# Patient Record
Sex: FEMALE | Race: WHITE | URBAN - METROPOLITAN AREA
[De-identification: names, ages, dates, MRNs, and addresses within clinical notes are randomized per-mention and may not be internally consistent; named-entity substitution may affect disease eponyms.]

---

## 2017-01-18 DIAGNOSIS — E78.2 MIXED HYPERLIPIDEMIA: Primary | ICD-10-CM

## 2017-01-18 DIAGNOSIS — E88.810 METABOLIC SYNDROME X: ICD-10-CM

## 2017-01-18 DIAGNOSIS — M25.569 ARTHRALGIA OF KNEE, UNSPECIFIED LATERALITY: ICD-10-CM

## 2017-01-25 DIAGNOSIS — E78.2 MIXED HYPERLIPIDEMIA: ICD-10-CM

## 2017-01-25 DIAGNOSIS — E88.810 METABOLIC SYNDROME X: ICD-10-CM

## 2017-01-25 DIAGNOSIS — M25.569 ARTHRALGIA OF KNEE, UNSPECIFIED LATERALITY: ICD-10-CM

## 2017-01-25 LAB
ALT SERPL W P-5'-P-CCNC: 64 U/L (ref 0–50)
AST SERPL W P-5'-P-CCNC: 31 U/L (ref 0–45)
CHOLEST SERPL-MCNC: 203 MG/DL
CK SERPL-CCNC: 165 U/L (ref 30–225)
HDLC SERPL-MCNC: 39 MG/DL
LDLC SERPL CALC-MCNC: ABNORMAL MG/DL
NONHDLC SERPL-MCNC: 164 MG/DL
TRIGL SERPL-MCNC: 414 MG/DL

## 2017-01-25 PROCEDURE — 84450 TRANSFERASE (AST) (SGOT): CPT | Performed by: FAMILY MEDICINE

## 2017-01-25 PROCEDURE — 82550 ASSAY OF CK (CPK): CPT | Performed by: FAMILY MEDICINE

## 2017-01-25 PROCEDURE — 80061 LIPID PANEL: CPT | Performed by: FAMILY MEDICINE

## 2017-01-25 PROCEDURE — 84460 ALANINE AMINO (ALT) (SGPT): CPT | Performed by: FAMILY MEDICINE

## 2017-01-25 PROCEDURE — 36415 COLL VENOUS BLD VENIPUNCTURE: CPT | Performed by: FAMILY MEDICINE

## 2017-02-06 DIAGNOSIS — E03.8 CENTRAL HYPOTHYROIDISM: Primary | ICD-10-CM

## 2017-02-06 RX ORDER — LEVOTHYROXINE SODIUM 175 UG/1
175 TABLET ORAL DAILY
Qty: 90 TABLET | Refills: 0 | Status: SHIPPED | OUTPATIENT
Start: 2017-02-06 | End: 2017-05-05

## 2017-02-06 NOTE — TELEPHONE ENCOUNTER
levothyroxine (SYNTHROID, LEVOTHROID) 175 MCG tablet      Last Written Prescription Date:  5/12/2016  Last Fill Quantity: 90,   # refills: 3  Last Office Visit : 12/9/2016  Future Office visit:  2/27/2017    Routing refill request to provider for review/approval because:  Per protocol-  Provider preference.

## 2017-02-21 DIAGNOSIS — E66.89 HYPOTHALAMIC OBESITY: Primary | ICD-10-CM

## 2017-02-21 DIAGNOSIS — E23.0 HYPOPITUITARISM DUE TO BRAIN TUMOR (H): ICD-10-CM

## 2017-02-21 DIAGNOSIS — E23.2 DIABETES INSIPIDUS (H): ICD-10-CM

## 2017-02-21 DIAGNOSIS — E11.9 TYPE 2 DIABETES MELLITUS WITHOUT COMPLICATION, WITHOUT LONG-TERM CURRENT USE OF INSULIN (H): ICD-10-CM

## 2017-02-21 DIAGNOSIS — D44.4: ICD-10-CM

## 2017-02-21 DIAGNOSIS — D49.6 HYPOPITUITARISM DUE TO BRAIN TUMOR (H): ICD-10-CM

## 2017-02-21 DIAGNOSIS — E22.1 HYPERPROLACTINEMIA (H): ICD-10-CM

## 2017-02-24 DIAGNOSIS — E11.9 TYPE 2 DIABETES MELLITUS WITHOUT COMPLICATION, WITHOUT LONG-TERM CURRENT USE OF INSULIN (H): ICD-10-CM

## 2017-02-24 DIAGNOSIS — E22.1 HYPERPROLACTINEMIA (H): ICD-10-CM

## 2017-02-24 DIAGNOSIS — D44.4: ICD-10-CM

## 2017-02-24 DIAGNOSIS — E23.2 DIABETES INSIPIDUS (H): ICD-10-CM

## 2017-02-24 DIAGNOSIS — E23.0 HYPOPITUITARISM DUE TO BRAIN TUMOR (H): ICD-10-CM

## 2017-02-24 DIAGNOSIS — E66.89 HYPOTHALAMIC OBESITY: ICD-10-CM

## 2017-02-24 DIAGNOSIS — D49.6 HYPOPITUITARISM DUE TO BRAIN TUMOR (H): ICD-10-CM

## 2017-02-24 LAB
CHOLEST SERPL-MCNC: 368 MG/DL
HBA1C MFR BLD: 6 % (ref 4.3–6)
HDLC SERPL-MCNC: 29 MG/DL
LDLC SERPL CALC-MCNC: ABNORMAL MG/DL
NONHDLC SERPL-MCNC: 339 MG/DL
PROLACTIN SERPL-MCNC: 0 UG/L (ref 3–27)
SODIUM SERPL-SCNC: 135 MMOL/L (ref 133–144)
T4 FREE SERPL-MCNC: 0.83 NG/DL (ref 0.76–1.46)
TRIGL SERPL-MCNC: 2225 MG/DL

## 2017-02-24 PROCEDURE — 80061 LIPID PANEL: CPT

## 2017-02-24 PROCEDURE — 84439 ASSAY OF FREE THYROXINE: CPT

## 2017-02-24 PROCEDURE — 84146 ASSAY OF PROLACTIN: CPT

## 2017-02-24 PROCEDURE — 36415 COLL VENOUS BLD VENIPUNCTURE: CPT

## 2017-02-24 PROCEDURE — 84295 ASSAY OF SERUM SODIUM: CPT

## 2017-02-24 PROCEDURE — 83036 HEMOGLOBIN GLYCOSYLATED A1C: CPT

## 2017-02-27 ENCOUNTER — OFFICE VISIT (OUTPATIENT)
Dept: ENDOCRINOLOGY | Facility: CLINIC | Age: 37
End: 2017-02-27

## 2017-02-27 VITALS
HEIGHT: 65 IN | WEIGHT: 225.9 LBS | HEART RATE: 76 BPM | SYSTOLIC BLOOD PRESSURE: 120 MMHG | BODY MASS INDEX: 37.64 KG/M2 | DIASTOLIC BLOOD PRESSURE: 76 MMHG

## 2017-02-27 DIAGNOSIS — N91.2 AMENORRHEA: ICD-10-CM

## 2017-02-27 DIAGNOSIS — E66.89 HYPOTHALAMIC OBESITY: ICD-10-CM

## 2017-02-27 DIAGNOSIS — E23.0 HYPOPITUITARISM DUE TO BRAIN TUMOR (H): ICD-10-CM

## 2017-02-27 DIAGNOSIS — E23.0 HYPOPITUITARISM DUE TO BRAIN TUMOR (H): Primary | ICD-10-CM

## 2017-02-27 DIAGNOSIS — E78.2 MIXED HYPERLIPIDEMIA: ICD-10-CM

## 2017-02-27 DIAGNOSIS — D49.6 HYPOPITUITARISM DUE TO BRAIN TUMOR (H): ICD-10-CM

## 2017-02-27 DIAGNOSIS — E11.9 TYPE 2 DIABETES MELLITUS WITHOUT COMPLICATION, WITHOUT LONG-TERM CURRENT USE OF INSULIN (H): ICD-10-CM

## 2017-02-27 DIAGNOSIS — D49.6 HYPOPITUITARISM DUE TO BRAIN TUMOR (H): Primary | ICD-10-CM

## 2017-02-27 RX ORDER — OMEGA-3-ACID ETHYL ESTERS 1 G/1
1 CAPSULE, LIQUID FILLED ORAL 2 TIMES DAILY
Qty: 120 CAPSULE | Refills: 1 | Status: SHIPPED | OUTPATIENT
Start: 2017-02-27 | End: 2017-03-24

## 2017-02-27 ASSESSMENT — PAIN SCALES - GENERAL: PAINLEVEL: NO PAIN (0)

## 2017-02-27 NOTE — PROGRESS NOTES
Endocrinology Consult Note    Attending ASSESSMENT/PLAN:     1.  Diabetes mellitus type 2. HgbA1c 6% on 2/24/17.     Would consider adding metformin for possible benefits on lipids and metabolism.  She is not interested in this at this time.    2.  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  She is not having nocturnal symptoms therefore I would not increase the DDAVP.   Not addressed    Hypopituitarism , partial - treatment is in place for the following:  A) hypothyroidism.  Treat to high normal free T4.  I didn't raise the LT4 dose today but will send her  A note and offer this.      B) hypogonadotropic hypogonadism causing secondary amenorrhea. Newly on cabergoline (since late 12/16) -nol periods yet. We will give it a few more months and then stop cabergoline if periods do not resume.  Measure estradiol.     Hypothalamic Obesity . She has been seen in Weight management in the past but hasnt' followed up there, isn't doing what they recommended.      Mixed Hyperlipidemia - very high TG and cholesterol.  This and the DM and central obesity combine to equal metabolic syndrome.  Intolerance of lipitor (arthralgia)- doesn't want to take a statin primarily, also doesn't want to take a pharmaceutial.    Consider pravastatin, fenofibrate    She agreed to try Lovaza.  STart with 1 gram bid and rapidly escalate to 2 grams BID.  iF we can't get it we will try fenofbrate.      Metabolic syndrome - Fatty liver in past imaging    Hyperprolactinemia -   Newly on cabergoline to see if we can restore her periods. Prolactin is now 0.      Greater than 50% of 25 minute appt on counseling    Gabriela Deal      Cc/ HISTORY OF PRESENT ILLNESS  Mariel presents, along with her friend,  for follow up of hypothalamic tumor, DI, hypopiuitarism and  DM, metabolic syndrome with significant mixed hyperlipidemia.  She is getting care in 2 systems, including [facility].  I have again  reviewed Care Everywhere.  She was last seen by me 12/16.  Since then, we started cabergoline to see if lowering the prolactin would restore her periods.  We also started Lipitor 20 mg/day followed one week later by zetia 10 mg/day.   She complained of arthralgia 1/17/17.    We have the following lipids  12/8/16: prior to start of lipitor: cholesterol 381, , HDL 35, ALT 51, glucose 113  1/25/17: on lipitor with arthralgia: cholesterol 203, , HDL 39, ALT 64,   2/24/17: off lipitor: cholesterol 368, TG 2225, HDL 29, HgbA1c 6%--she says this was after a fast from around 9 PM.  On further questioning she recalled eating Peanut butter bars (likely ingredients including high fructose corn syrup, peanut butter) the nigh prior to the lipids.  She was on Fish oil (? Dose - 2 capsules/day)  and lemon grass    She didn't tolerate the lipitor and really doesn't want to take it again.  She is asking about red yeast rice.  She wants to do more research.  She really doesn't want to do a pharmaceutical .     Her current regimen is :  LT4 175 mcg /day --   DDAVP  pill  in the AM, 3 PM and at HS.    Fish oil -   Lemon grass 5 drops and a capsule bid--    Brain mass  presented with secondary amenorrhea and visual changes.  MRI 2/4/15 showed Cystic suprasellar lesion , 3.5 cm,  elevating and  and anteriorly displacing the optic chiasm;   2/4/15 surgery (ANW)  decompressed fluid and extracted tumor.    Pathology was read as papillary craniopharyngioma.  Postoperatively, she has had DI for which she continues on DDAVP tid.   11/6/15 1 mcg cortrosyn stim test  showed cortisol stimulation to 19.8.      Mariel experienced significant weight gain.     Around presentation / hypothalamic surgery 185# . Since the surgery she gained weight - up to 218 lbs  She has been to weight management clinic. She took phentermine in the past,  but not the topimimate which Dr Hopson had also recommended. She has not been back to weight  management clinic.        Labs:  1/30/15: TSh 0.8, T4 6.6, FSH 1.3, prolactin 58.5, cortisol 13.3  Operation  2/24/15: prolactin 61.5, free T4 0.5, cortisol < 1, IGF1 106, cholesterol 742, TG 2409, Na 148, [id number], creatinine 0.74, urine specific gravity < 1.005  3/20/15: glucose 151, Ca 10, total protein 9, albumin 4.2, alk phos 81, bili 0.2, , AST 68  4/20/15: free T4 0.93, free T3 2.47, cholesterol 357, , HDL 34,   5/1/15: cortisol 1.1  6/16/15: TSH < 0.01, free t4 1.06  7/29/15: TSH 0.01, free T4 0.85, glucose 155 (she says this was a fasting test), Calcium 10.6, Na 144, K 4.4, Cl 104, Co2 21,   11/4/15: HgbA1c 6.7%.   5/19/16: LDL direct 132  7/14/16: cholesterol 178, , HDL 34, T3 80, free T4 0.986  9/27/16: cholesterol 341, , HDL 30, LDL direct 139, T3 111, free T4 0.89    Weights  2/2/15 188#  2/23/15: 197#  4/20/15: 212#  12/18/15: 209   11/14/16: 217#    images pushed to [facility]ite  1/28/15 MRI cystic suprasellar mass   5/4/15 MRI- normal appearing pituitary (read as optic chiasm well decompressed. No evidence of residual craniopharyngioma)  5/9/16 MRI  reports stability     REVIEW OF SYSTEMS  questions about diet  -? Ketogenic diet.   GI: no abdominal pain  Going to gratitude class today  Needs to research my recommendations.   Tolerating cabergoline - started before [place].  Has not had a period    Past Medical History  Past Medical History   Diagnosis Date     Central hypothyroidism 2015     Cervical high risk HPV (human papillomavirus) test positive 11/8/11     HPV 16     Craniopharyngioma in adult (H) 2/15     Diabetes insipidus (H) 2015     Diabetes mellitus (H) 2015     Fatty liver      on CT     Gestational diabetes      gestational 2008/2010     Hyperlipidemia      Hypogonadotropic hypogonadism (H) 2015     Hypothalamic obesity 2015     Past Surgical History   Procedure Laterality Date     Frontotemporal craniotomy Right 2/4/15     Resection of suprasellar tumor  2/4/15  "      Medications    Current Outpatient Prescriptions   Medication Sig Dispense Refill     omega-3 acid ethyl esters (LOVAZA) 1 G capsule Take 1 capsule (1 g) by mouth 2 times daily 120 capsule 1     levothyroxine (SYNTHROID/LEVOTHROID) 175 MCG tablet Take 1 tablet (175 mcg) by mouth daily 90 tablet 0     cabergoline (DOSTINEX) 0.5 MG tablet Take 0.5 tablets (0.25 mg) by mouth twice a week 12 tablet 1     desmopressin (DDAVP) 0.1 MG tablet 1 tab 3 times daily 270 tablet 3     order for DME Equipment being ordered: Medium plantar fasciitis night splint 1 Device 0     Fish Oil-Cholecalciferol (OMEGA-3 FISH OIL/VITAMIN D3) 8043-7159 MG-UNIT CAPS Take 2 capsules by mouth daily       blood glucose monitoring (HECTOR CONTOUR NEXT) test strip Use to test blood sugar 2 times daily or as directed. 3 month supply 200 each 3     MICROLET LANCETS MISC Use to test blood glucose twice daily.  3 month supply 200 each 4     atorvastatin (LIPITOR) 20 MG tablet Take 1 tablet (20 mg) by mouth daily (Patient not taking: Reported on 2/27/2017) 90 tablet 3     ezetimibe (ZETIA) 10 MG tablet Take 1 tablet (10 mg) by mouth daily (Patient not taking: Reported on 2/27/2017) 90 tablet 3       Allergies  Allergies   Allergen Reactions     Nkda [No Known Drug Allergies]        Family History  No pituitary tumors  No thyroid  No DM  No kidney stones  family history includes Alzheimer Disease in her paternal grandfather; Breast Cancer in her paternal grandmother; C.A.D. in her maternal grandfather; GASTROINTESTINAL DISEASE in her father; Hypertension in her mother; Lipids in her paternal grandmother.    Social History  Social History   Substance Use Topics     Smoking status: Current Some Day Smoker     Packs/day: 0.50     Types: Cigarettes     Smokeless tobacco: Never Used      Comment: Occ     Alcohol use No     ; working with ;     Physical Exam  /76  Pulse 76  Ht 1.651 m (5' 5\")  Wt 102.5 kg (225 lb 14.4 oz)  BMI 37.59 " kg/m2  Body mass index is 37.59 kg/(m^2).  GENERAL : pleasant young woman  In no apparent distress.   SKIN: Normal color.  No hirsutism, alopecia    EYES: PER, No scleral icterus,  No proptosis, conjunctival redness, stare, retraction;   NEURO: awake, alert, responds appropriately to questions.   Moves all extremities;       DATA REVIEW    ENDO DIABETES Latest Ref Rng & Units 1/25/2017   HEMOGLOBIN A1C 4.3 - 6.0 %    HEMOGLOBIN A1C, POC 4.3 - 6 %    HGB 11.7 - 15.7 g/dL    GLUCOSE 70 - 99 mg/dL    CHOLESTEROL <200 mg/dL 203 (H)   LDL CHOLESTEROL, CALCULATED <100 mg/dL Cannot estimate LDL when triglyceride exceeds 400 mg/dL   HDL CHOLESTEROL >49 mg/dL 39 (L)   VLDL-CHOLESTEROL 0 - 30 mg/dL    NON HDL CHOLESTEROL <130 mg/dL 164 (H)   TRIGLYCERIDES <150 mg/dL 414 (H)   POTASSIUM 3.4 - 5.3 mmol/L    CK TOTAL 30 - 225 U/L 165   ALT 0 - 50 U/L 64 (H)   AST 0 - 45 U/L 31   WBC 4.0 - 11.0 10e9/L    RBC 3.8 - 5.2 10e12/L    HGB 11.7 - 15.7 g/dL    HCT 35.0 - 47.0 %    MCV 78 - 100 fl    MCH 26.5 - 33.0 pg    MCHC 31.5 - 36.5 g/dL    RDW 10.0 - 15.0 %     - 450 10e9/L      ENDO DIABETES Latest Ref Rng & Units 2/24/2017   HEMOGLOBIN A1C 4.3 - 6.0 % 6.0   HEMOGLOBIN A1C, POC 4.3 - 6 %    HGB 11.7 - 15.7 g/dL    GLUCOSE 70 - 99 mg/dL    CHOLESTEROL <200 mg/dL 368 (H)   LDL CHOLESTEROL, CALCULATED <100 mg/dL Cannot estimate LDL when triglyceride exceeds 400 mg/dL   HDL CHOLESTEROL >49 mg/dL 29 (L)   VLDL-CHOLESTEROL 0 - 30 mg/dL    NON HDL CHOLESTEROL <130 mg/dL 339 (H)   TRIGLYCERIDES <150 mg/dL 2225 (H)   POTASSIUM 3.4 - 5.3 mmol/L    CK TOTAL 30 - 225 U/L    ALT 0 - 50 U/L    AST 0 - 45 U/L    WBC 4.0 - 11.0 10e9/L    RBC 3.8 - 5.2 10e12/L    HGB 11.7 - 15.7 g/dL    HCT 35.0 - 47.0 %    MCV 78 - 100 fl    MCH 26.5 - 33.0 pg    MCHC 31.5 - 36.5 g/dL    RDW 10.0 - 15.0 %     - 450 10e9/L        ENDO THYROID LABS-[facility] Latest Ref Rng & Units 2/24/2017 12/8/2016   TSH 0.4 - 5.0 mU/L     T4 TOTAL 4.5 - 13.9 ug/dL     T4  FREE 0.76 - 1.46 ng/dL 0.83 0.92   TRIIODOTHYRONINE(T3) 60 - 181 ng/dL  89     ENDO THYROID LABS-[facility] Latest Ref Rng & Units 11/14/2016   TSH 0.4 - 5.0 mU/L    T4 TOTAL 4.5 - 13.9 ug/dL    T4 FREE 0.76 - 1.46 ng/dL 0.82   TRIIODOTHYRONINE(T3) 60 - 181 ng/dL 130

## 2017-02-27 NOTE — MR AVS SNAPSHOT
After Visit Summary   2/27/2017    Mariel Florian    MRN: [id number]           Patient Information     Date Of Birth          1980        Visit Information        Provider Department      2/27/2017 1:30 PM Gabriela Deal MD [facility]        Today's Diagnoses     partial hypopituitarism     -  1    secondary amenorrhea        Mixed hyperlipidemia        Type 2 diabetes mellitus without complication, without long-term current use of insulin (H)        Hypothalamic obesity          Care Instructions    Start Lovaza 1 gram twice/day.  Repeat lipids after 12 hour overnight in about 2 weeks.                  Follow-ups after your visit        Follow-up notes from your care team     Return in about 2 months (around 4/27/2017).      Your next 10 appointments already scheduled     Feb 27, 2017  2:00 PM CST   LAB with  LAB   [facility] ([facility])    [address] [place] [id number] [phone]           Patient must bring picture ID.  Patient should be prepared to give a urine specimen  Please do not eat 10-12 hours before your appointment if you are coming in fasting for labs on lipids, cholesterol, or glucose (sugar).  Pregnant women should follow their Care Team instructions. Water with medications is okay. Do not drink coffee or other fluids.   If you have concerns about taking  your medications, please ask at office or if scheduling via [employer], send a message by clicking on Secure Messaging, Message Your Care Team.            May 05, 2017 12:00 PM CDT   (Arrive by 11:45 AM)   RETURN ENDOCRINE with Gabriela Deal MD   [facility] ([facility])    [address] [place] [id number] [phone]              Future tests that were ordered for you today     Open Future Orders        Priority Expected Expires Ordered    Lipid Profile Routine  2/27/2018 2/27/2017     "Estradiol Routine  2/27/2018 2/27/2017            Who to contact     Please call your clinic at [phone] to:    Ask questions about your health    Make or cancel appointments    Discuss your medicines    Learn about your test results    Speak to your doctor   If you have compliments or concerns about an experience at your clinic, or if you wish to file a complaint, please contact [facility] Patient Relations at [phone] or email us at [email or web address]         Additional Information About Your Visit        [employer] Information     [employer] gives you secure access to your electronic health record. If you see a primary care provider, you can also send messages to your care team and make appointments. If you have questions, please call your primary care clinic.  If you do not have a primary care provider, please call [phone] and they will assist you.      [employer] is an electronic gateway that provides easy, online access to your medical records. With [employer], you can request a clinic appointment, read your test results, renew a prescription or communicate with your care team.     To access your existing account, please contact your [facility] Clinic or call [phone] for assistance.        Care EveryWhere ID     This is your Care EveryWhere ID. This could be used by other organizations to access your [place] medical records  [id number]        Your Vitals Were     Pulse Height BMI (Body Mass Index)             76 1.651 m (5' 5\") 37.59 kg/m2          Blood Pressure from Last 3 Encounters:   02/27/17 120/76   12/09/16 116/74   11/14/16 126/83    Weight from Last 3 Encounters:   02/27/17 102.5 kg (225 lb 14.4 oz)   12/09/16 98.9 kg (218 lb)   11/14/16 98.8 kg (217 lb 14.4 oz)                 Today's Medication Changes          These changes are accurate as of: 2/27/17  1:57 PM.  If you have any questions, ask your nurse or doctor.             "   Start taking these medicines.        Dose/Directions    omega-3 acid ethyl esters 1 G capsule   Commonly known as:  Lovaza   Used for:  Hypopituitarism due to brain tumor (H), Amenorrhea, Mixed hyperlipidemia   Started by:  Gabriela Deal MD        Dose:  1 g   Take 1 capsule (1 g) by mouth 2 times daily   Quantity:  120 capsule   Refills:  1            Where to get your medicines      These medications were sent to [place] [address] [facility] [employer] - [facility] [address] [facility]  [address] [facility] [address]     Phone:  [phone]     omega-3 acid ethyl esters 1 G capsule                Primary Care Provider Office Phone # Fax #    [facility] [phone] [address] [facility] [address]        Thank you!     Thank you for choosing [facility]  for your care. Our goal is always to provide you with excellent care. Hearing back from our patients is one way we can continue to improve our services. Please take a few minutes to complete the written survey that you may receive in the mail after your visit with us. Thank you!             Your Updated Medication List - Protect others around you: Learn how to safely use, store and throw away your medicines at www.disposemymeds.org.          This list is accurate as of: 2/27/17  1:57 PM.  Always use your most recent med list.                   Brand Name Dispense Instructions for use    atorvastatin 20 MG tablet    LIPITOR    90 tablet    Take 1 tablet (20 mg) by mouth daily       blood glucose monitoring test strip    HECTOR CONTOUR NEXT    200 each    Use to test blood sugar 2 times daily or as directed. 3 month supply       cabergoline 0.5 MG tablet    DOSTINEX    12 tablet    Take 0.5 tablets (0.25 mg) by mouth twice a week       desmopressin 0.1 MG tablet    DDAVP    270 tablet    1 tab 3 times daily       ezetimibe 10 MG tablet    ZETIA    90 tablet    Take 1 tablet (10 mg) by mouth daily        levothyroxine 175 MCG tablet    SYNTHROID/LEVOTHROID    90 tablet    Take 1 tablet (175 mcg) by mouth daily       MICROLET LANCETS Misc     200 each    Use to test blood glucose twice daily.  3 month supply       omega-3 acid ethyl esters 1 G capsule    Lovaza    120 capsule    Take 1 capsule (1 g) by mouth 2 times daily       Omega-3 Fish Oil/Vitamin D3 7224-1769 MG-UNIT Caps      Take 2 capsules by mouth daily       order for DME     1 Device    Equipment being ordered: Medium plantar fasciitis night splint

## 2017-02-27 NOTE — LETTER
2/27/2017       RE: Mariel Florian  [address] [facility] [address]     Dear Colleague,    Thank you for referring your patient, Mariel Florian, to the [facility] ENDOCRINOLOGY at [facility]. Please see a copy of my visit note below.    Endocrinology Consult Note    Attending ASSESSMENT/PLAN:     1.  Diabetes mellitus type 2. HgbA1c 6% on 2/24/17.     Would consider adding metformin for possible benefits on lipids and metabolism.  She is not interested in this at this time.    2.  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  She is not having nocturnal symptoms therefore I would not increase the DDAVP.   Not addressed    Hypopituitarism , partial - treatment is in place for the following:  A) hypothyroidism.  Treat to high normal free T4.  I didn't raise the LT4 dose today but will send her  A note and offer this.      B) hypogonadotropic hypogonadism causing secondary amenorrhea. Newly on cabergoline (since late 12/16) -nol periods yet. We will give it a few more months and then stop cabergoline if periods do not resume.  Measure estradiol.     Hypothalamic Obesity . She has been seen in Weight management in the past but hasnt' followed up there, isn't doing what they recommended.      Mixed Hyperlipidemia - very high TG and cholesterol.  This and the DM and central obesity combine to equal metabolic syndrome.  Intolerance of lipitor (arthralgia)- doesn't want to take a statin primarily, also doesn't want to take a pharmaceutial.    Consider pravastatin, fenofibrate    She agreed to try Lovaza.  STart with 1 gram bid and rapidly escalate to 2 grams BID.  iF we can't get it we will try fenofbrate.      Metabolic syndrome - Fatty liver in past imaging    Hyperprolactinemia -   Newly on cabergoline to see if we can restore her periods. Prolactin is now 0.      Greater than 50% of 25 minute appt on counseling    Gabriela SYLVESTER  Toyin Pete/ HISTORY OF PRESENT ILLNESS  Mariel presents, along with her friend,  for follow up of hypothalamic tumor, DI, hypopiuitarism and  DM, metabolic syndrome with significant mixed hyperlipidemia.  She is getting care in 2 systems, including [facility].  I have again reviewed Care Everywhere.  She was last seen by me 12/16.  Since then, we started cabergoline to see if lowering the prolactin would restore her periods.  We also started Lipitor 20 mg/day followed one week later by zetia 10 mg/day.   She complained of arthralgia 1/17/17.    We have the following lipids  12/8/16: prior to start of lipitor: cholesterol 381, , HDL 35, ALT 51, glucose 113  1/25/17: on lipitor with arthralgia: cholesterol 203, , HDL 39, ALT 64,   2/24/17: off lipitor: cholesterol 368, TG 2225, HDL 29, HgbA1c 6%--she says this was after a fast from around 9 PM.  On further questioning she recalled eating Peanut butter bars (likely ingredients including high fructose corn syrup, peanut butter) the nigh prior to the lipids.  She was on Fish oil (? Dose - 2 capsules/day)  and lemon grass    She didn't tolerate the lipitor and really doesn't want to take it again.  She is asking about red yeast rice.  She wants to do more research.  She really doesn't want to do a pharmaceutical .     Her current regimen is :  LT4 175 mcg /day --   DDAVP  pill  in the AM, 3 PM and at HS.    Fish oil -   Lemon grass 5 drops and a capsule bid--    Brain mass  presented with secondary amenorrhea and visual changes.  MRI 2/4/15 showed Cystic suprasellar lesion , 3.5 cm,  elevating and  and anteriorly displacing the optic chiasm;   2/4/15 surgery (ANW)  decompressed fluid and extracted tumor.    Pathology was read as papillary craniopharyngioma.  Postoperatively, she has had DI for which she continues on DDAVP tid.   11/6/15 1 mcg cortrosyn stim test  showed cortisol stimulation to 19.8.      Mariel experienced significant weight gain.      Around presentation / hypothalamic surgery 185# . Since the surgery she gained weight - up to 218 lbs  She has been to weight management clinic. She took phentermine in the past,  but not the topimimate which Dr Hopson had also recommended. She has not been back to weight management clinic.        Labs:  1/30/15: TSh 0.8, T4 6.6, FSH 1.3, prolactin 58.5, cortisol 13.3  Operation  2/24/15: prolactin 61.5, free T4 0.5, cortisol < 1, IGF1 106, cholesterol 742, TG 2409, Na 148, euqteaz43, creatinine 0.74, urine specific gravity < 1.005  3/20/15: glucose 151, Ca 10, total protein 9, albumin 4.2, alk phos 81, bili 0.2, , AST 68  4/20/15: free T4 0.93, free T3 2.47, cholesterol 357, , HDL 34,   5/1/15: cortisol 1.1  6/16/15: TSH < 0.01, free t4 1.06  7/29/15: TSH 0.01, free T4 0.85, glucose 155 (she says this was a fasting test), Calcium 10.6, Na 144, K 4.4, Cl 104, Co2 21,   11/4/15: HgbA1c 6.7%.   5/19/16: LDL direct 132  7/14/16: cholesterol 178, , HDL 34, T3 80, free T4 0.986  9/27/16: cholesterol 341, , HDL 30, LDL direct 139, T3 111, free T4 0.89    Weights  2/2/15 188#  2/23/15: 197#  4/20/15: 212#  12/18/15: 209   11/14/16: 217#    images pushed to [facility]ite  1/28/15 MRI cystic suprasellar mass   5/4/15 MRI- normal appearing pituitary (read as optic chiasm well decompressed. No evidence of residual craniopharyngioma)  5/9/16 MRI  reports stability     REVIEW OF SYSTEMS  questions about diet  -? Ketogenic diet.   GI: no abdominal pain  Going to gratitude class today  Needs to research my recommendations.   Tolerating cabergoline - started before Li.  Has not had a period    Past Medical History  Past Medical History   Diagnosis Date     Central hypothyroidism 2015     Cervical high risk HPV (human papillomavirus) test positive 11/8/11     HPV 16     Craniopharyngioma in adult (H) 2/15     Diabetes insipidus (H) 2015     Diabetes mellitus (H) 2015     Fatty liver      on CT      Gestational diabetes      gestational 2008/2010     Hyperlipidemia      Hypogonadotropic hypogonadism (H) 2015     Hypothalamic obesity 2015     Past Surgical History   Procedure Laterality Date     Frontotemporal craniotomy Right 2/4/15     Resection of suprasellar tumor  2/4/15       Medications    Current Outpatient Prescriptions   Medication Sig Dispense Refill     omega-3 acid ethyl esters (LOVAZA) 1 G capsule Take 1 capsule (1 g) by mouth 2 times daily 120 capsule 1     levothyroxine (SYNTHROID/LEVOTHROID) 175 MCG tablet Take 1 tablet (175 mcg) by mouth daily 90 tablet 0     cabergoline (DOSTINEX) 0.5 MG tablet Take 0.5 tablets (0.25 mg) by mouth twice a week 12 tablet 1     desmopressin (DDAVP) 0.1 MG tablet 1 tab 3 times daily 270 tablet 3     order for DME Equipment being ordered: Medium plantar fasciitis night splint 1 Device 0     Fish Oil-Cholecalciferol (OMEGA-3 FISH OIL/VITAMIN D3) 9929-2537 MG-UNIT CAPS Take 2 capsules by mouth daily       blood glucose monitoring (HECTOR CONTOUR NEXT) test strip Use to test blood sugar 2 times daily or as directed. 3 month supply 200 each 3     MICROLET LANCETS MISC Use to test blood glucose twice daily.  3 month supply 200 each 4     atorvastatin (LIPITOR) 20 MG tablet Take 1 tablet (20 mg) by mouth daily (Patient not taking: Reported on 2/27/2017) 90 tablet 3     ezetimibe (ZETIA) 10 MG tablet Take 1 tablet (10 mg) by mouth daily (Patient not taking: Reported on 2/27/2017) 90 tablet 3       Allergies  Allergies   Allergen Reactions     Nkda [No Known Drug Allergies]        Family History  No pituitary tumors  No thyroid  No DM  No kidney stones  family history includes Alzheimer Disease in her paternal grandfather; Breast Cancer in her paternal grandmother; C.A.D. in her maternal grandfather; GASTROINTESTINAL DISEASE in her father; Hypertension in her mother; Lipids in her paternal grandmother.    Social History  Social History   Substance Use Topics     Smoking  "status: Current Some Day Smoker     Packs/day: 0.50     Types: Cigarettes     Smokeless tobacco: Never Used      Comment: Occ     Alcohol use No     ; working with ;     Physical Exam  /76  Pulse 76  Ht 1.651 m (5' 5\")  Wt 102.5 kg (225 lb 14.4 oz)  BMI 37.59 kg/m2  Body mass index is 37.59 kg/(m^2).  GENERAL : pleasant young woman  In no apparent distress.   SKIN: Normal color.  No hirsutism, alopecia    EYES: PER, No scleral icterus,  No proptosis, conjunctival redness, stare, retraction;   NEURO: awake, alert, responds appropriately to questions.   Moves all extremities;       DATA REVIEW    ENDO DIABETES Latest Ref Rng & Units 1/25/2017   HEMOGLOBIN A1C 4.3 - 6.0 %    HEMOGLOBIN A1C, POC 4.3 - 6 %    HGB 11.7 - 15.7 g/dL    GLUCOSE 70 - 99 mg/dL    CHOLESTEROL <200 mg/dL 203 (H)   LDL CHOLESTEROL, CALCULATED <100 mg/dL Cannot estimate LDL when triglyceride exceeds 400 mg/dL   HDL CHOLESTEROL >49 mg/dL 39 (L)   VLDL-CHOLESTEROL 0 - 30 mg/dL    NON HDL CHOLESTEROL <130 mg/dL 164 (H)   TRIGLYCERIDES <150 mg/dL 414 (H)   POTASSIUM 3.4 - 5.3 mmol/L    CK TOTAL 30 - 225 U/L 165   ALT 0 - 50 U/L 64 (H)   AST 0 - 45 U/L 31   WBC 4.0 - 11.0 10e9/L    RBC 3.8 - 5.2 10e12/L    HGB 11.7 - 15.7 g/dL    HCT 35.0 - 47.0 %    MCV 78 - 100 fl    MCH 26.5 - 33.0 pg    MCHC 31.5 - 36.5 g/dL    RDW 10.0 - 15.0 %     - 450 10e9/L      ENDO DIABETES Latest Ref Rng & Units 2/24/2017   HEMOGLOBIN A1C 4.3 - 6.0 % 6.0   HEMOGLOBIN A1C, POC 4.3 - 6 %    HGB 11.7 - 15.7 g/dL    GLUCOSE 70 - 99 mg/dL    CHOLESTEROL <200 mg/dL 368 (H)   LDL CHOLESTEROL, CALCULATED <100 mg/dL Cannot estimate LDL when triglyceride exceeds 400 mg/dL   HDL CHOLESTEROL >49 mg/dL 29 (L)   VLDL-CHOLESTEROL 0 - 30 mg/dL    NON HDL CHOLESTEROL <130 mg/dL 339 (H)   TRIGLYCERIDES <150 mg/dL 2225 (H)   POTASSIUM 3.4 - 5.3 mmol/L    CK TOTAL 30 - 225 U/L    ALT 0 - 50 U/L    AST 0 - 45 U/L    WBC 4.0 - 11.0 10e9/L    RBC 3.8 - 5.2 10e12/L  "   HGB 11.7 - 15.7 g/dL    HCT 35.0 - 47.0 %    MCV 78 - 100 fl    MCH 26.5 - 33.0 pg    MCHC 31.5 - 36.5 g/dL    RDW 10.0 - 15.0 %     - 450 10e9/L        ENDO THYROID LABS-[facility] Latest Ref Rng & Units 2/24/2017 12/8/2016   TSH 0.4 - 5.0 mU/L     T4 TOTAL 4.5 - 13.9 ug/dL     T4 FREE 0.76 - 1.46 ng/dL 0.83 0.92   TRIIODOTHYRONINE(T3) 60 - 181 ng/dL  89     ENDO THYROID LABS-[facility] Latest Ref Rng & Units 11/14/2016   TSH 0.4 - 5.0 mU/L    T4 TOTAL 4.5 - 13.9 ug/dL    T4 FREE 0.76 - 1.46 ng/dL 0.82   TRIIODOTHYRONINE(T3) 60 - 181 ng/dL 130       Again, thank you for allowing me to participate in the care of your patient.      Sincerely,    Gabriela Deal MD

## 2017-03-24 DIAGNOSIS — N91.2 AMENORRHEA: ICD-10-CM

## 2017-03-24 DIAGNOSIS — D49.6 HYPOPITUITARISM DUE TO BRAIN TUMOR (H): ICD-10-CM

## 2017-03-24 DIAGNOSIS — E78.2 MIXED HYPERLIPIDEMIA: ICD-10-CM

## 2017-03-24 DIAGNOSIS — E23.0 HYPOPITUITARISM DUE TO BRAIN TUMOR (H): ICD-10-CM

## 2017-03-24 LAB
CHOLEST SERPL-MCNC: 350 MG/DL
ESTRADIOL SERPL-MCNC: 16 PG/ML
HDLC SERPL-MCNC: 34 MG/DL
LDLC SERPL CALC-MCNC: ABNORMAL MG/DL
NONHDLC SERPL-MCNC: 316 MG/DL
TRIGL SERPL-MCNC: 1060 MG/DL

## 2017-03-24 PROCEDURE — 80061 LIPID PANEL: CPT | Performed by: FAMILY MEDICINE

## 2017-03-24 PROCEDURE — 82670 ASSAY OF TOTAL ESTRADIOL: CPT | Performed by: FAMILY MEDICINE

## 2017-03-24 PROCEDURE — 36415 COLL VENOUS BLD VENIPUNCTURE: CPT | Performed by: FAMILY MEDICINE

## 2017-03-24 RX ORDER — OMEGA-3-ACID ETHYL ESTERS 1 G/1
2 CAPSULE, LIQUID FILLED ORAL 2 TIMES DAILY
Qty: 120 CAPSULE | Refills: 11 | Status: SHIPPED | OUTPATIENT
Start: 2017-03-24

## 2017-04-06 DIAGNOSIS — E22.1 HYPERPROLACTINEMIA (H): ICD-10-CM

## 2017-04-06 RX ORDER — CABERGOLINE 0.5 MG/1
0.25 TABLET ORAL
Qty: 12 TABLET | Refills: 1 | Status: SHIPPED | OUTPATIENT
Start: 2017-04-06

## 2017-04-06 NOTE — TELEPHONE ENCOUNTER
cabergoline (DOSTINEX) 0.5 MG tablet   Last Written Prescription Date:  12/22/16  Last Fill Quantity: 12,   # refills: 1  Last Office Visit with G, P or [facility] prescribing provider: 2/27/17  Future Office visit:   5/16/17    Routing refill request to provider for review/approval because:   cabergoline (DOSTINEX) 0.5 MG tablet. Not on SO protocol. Provider preference.

## 2017-05-05 DIAGNOSIS — E03.8 CENTRAL HYPOTHYROIDISM: ICD-10-CM

## 2017-05-08 RX ORDER — LEVOTHYROXINE SODIUM 175 UG/1
175 TABLET ORAL DAILY
Qty: 30 TABLET | Refills: 0 | Status: SHIPPED | OUTPATIENT
Start: 2017-05-08 | End: 2017-06-07

## 2017-06-07 DIAGNOSIS — E03.8 CENTRAL HYPOTHYROIDISM: ICD-10-CM

## 2017-06-07 RX ORDER — LEVOTHYROXINE SODIUM 175 UG/1
175 TABLET ORAL DAILY
Qty: 90 TABLET | Refills: 2 | Status: SHIPPED | OUTPATIENT
Start: 2017-06-07

## 2017-06-07 NOTE — TELEPHONE ENCOUNTER
levothyroxine (SYNTHROID/LEVOTHROID) 175 MCG tablet      Last Written Prescription Date:  5/8/2107  Last Fill Quantity: 30,   # refills: 0  Last Office Visit : 2/27/2017  Future Office visit:  none    Routing refill request to provider for review/approval because:  Provider preference

## 2017-09-24 ENCOUNTER — HEALTH MAINTENANCE LETTER (OUTPATIENT)
Age: 37
End: 2017-09-24

## 2017-11-22 DIAGNOSIS — E23.2 DIABETES INSIPIDUS (H): ICD-10-CM

## 2017-11-22 RX ORDER — DESMOPRESSIN ACETATE 0.1 MG/1
TABLET ORAL
Qty: 270 TABLET | Refills: 3 | Status: SHIPPED | OUTPATIENT
Start: 2017-11-22

## 2017-11-22 NOTE — TELEPHONE ENCOUNTER
desmopressin (DDAVP) 0.1 MG tablet  Last Written Prescription Date: 9/7/16  Last Fill Quantity: 270,   # refills: 3  Last Office Visit : 2/27/17  Future Office visit: none    Routing refill request to provider for review/approval because:     desmopressin (DDAVP) 0.1 MG table. Provider preference.

## 2018-10-01 ENCOUNTER — HEALTH MAINTENANCE LETTER (OUTPATIENT)
Age: 38
End: 2018-10-01

## 2020-02-23 ENCOUNTER — HEALTH MAINTENANCE LETTER (OUTPATIENT)
Age: 40
End: 2020-02-23

## 2020-12-02 NOTE — TELEPHONE ENCOUNTER
12/9/16  nvd 2/27/17     Vaccine Information Statement(s) was given today. This has been reviewed, questions answered, and verbal consent given by Parent for injection(s) and administration of Influenza (Inactivated).    Patient tolerated without incident. See immunization grid for documentation.

## 2020-12-06 ENCOUNTER — HEALTH MAINTENANCE LETTER (OUTPATIENT)
Age: 40
End: 2020-12-06

## 2021-04-11 ENCOUNTER — HEALTH MAINTENANCE LETTER (OUTPATIENT)
Age: 41
End: 2021-04-11

## 2021-08-01 ENCOUNTER — HEALTH MAINTENANCE LETTER (OUTPATIENT)
Age: 41
End: 2021-08-01

## 2021-09-26 ENCOUNTER — HEALTH MAINTENANCE LETTER (OUTPATIENT)
Age: 41
End: 2021-09-26

## 2022-03-06 ENCOUNTER — HEALTH MAINTENANCE LETTER (OUTPATIENT)
Age: 42
End: 2022-03-06

## 2022-05-01 ENCOUNTER — HEALTH MAINTENANCE LETTER (OUTPATIENT)
Age: 42
End: 2022-05-01

## 2022-11-21 ENCOUNTER — HEALTH MAINTENANCE LETTER (OUTPATIENT)
Age: 42
End: 2022-11-21

## 2023-06-02 ENCOUNTER — HEALTH MAINTENANCE LETTER (OUTPATIENT)
Age: 43
End: 2023-06-02

## 2024-02-03 ENCOUNTER — HEALTH MAINTENANCE LETTER (OUTPATIENT)
Age: 44
End: 2024-02-03